# Patient Record
Sex: MALE | Race: WHITE | NOT HISPANIC OR LATINO | Employment: FULL TIME | URBAN - METROPOLITAN AREA
[De-identification: names, ages, dates, MRNs, and addresses within clinical notes are randomized per-mention and may not be internally consistent; named-entity substitution may affect disease eponyms.]

---

## 2023-03-06 PROBLEM — E66.811 CLASS 1 OBESITY WITH BODY MASS INDEX (BMI) OF 33.0 TO 33.9 IN ADULT: Status: ACTIVE | Noted: 2023-03-06

## 2023-03-06 PROBLEM — R53.83 MALAISE AND FATIGUE: Status: ACTIVE | Noted: 2023-03-06

## 2023-03-06 PROBLEM — F17.200 NICOTINE DEPENDENCE: Status: ACTIVE | Noted: 2023-03-06

## 2023-03-06 PROBLEM — E66.9 CLASS 1 OBESITY WITH BODY MASS INDEX (BMI) OF 33.0 TO 33.9 IN ADULT: Status: ACTIVE | Noted: 2023-03-06

## 2023-03-06 PROBLEM — H10.9 CONJUNCTIVITIS: Status: ACTIVE | Noted: 2023-03-06

## 2023-03-06 PROBLEM — J32.9 SINUSITIS: Status: ACTIVE | Noted: 2023-03-06

## 2023-03-06 PROBLEM — R53.81 MALAISE AND FATIGUE: Status: ACTIVE | Noted: 2023-03-06

## 2023-03-06 PROBLEM — M25.569 JOINT PAIN, KNEE: Status: ACTIVE | Noted: 2023-03-06

## 2023-03-06 PROBLEM — E55.9 VITAMIN D DEFICIENCY: Status: ACTIVE | Noted: 2023-03-06

## 2023-03-06 PROBLEM — S49.92XA INJURY OF LEFT SHOULDER: Status: ACTIVE | Noted: 2023-03-06

## 2023-03-06 PROBLEM — M54.9 BACK PAIN: Status: ACTIVE | Noted: 2023-03-06

## 2023-03-06 PROBLEM — L02.01 ABSCESS OF FACE: Status: ACTIVE | Noted: 2023-03-06

## 2023-03-06 PROBLEM — M54.2 NECK PAIN: Status: ACTIVE | Noted: 2023-03-06

## 2023-03-06 PROBLEM — M79.604 RIGHT LEG PAIN: Status: ACTIVE | Noted: 2023-03-06

## 2023-03-06 PROBLEM — E78.5 HYPERLIPIDEMIA: Status: ACTIVE | Noted: 2023-03-06

## 2023-03-06 PROBLEM — H57.89 IRRITATION OF RIGHT EYE: Status: ACTIVE | Noted: 2023-03-06

## 2023-03-06 PROBLEM — G47.00 INSOMNIA: Status: ACTIVE | Noted: 2023-03-06

## 2023-03-06 RX ORDER — VALACYCLOVIR HYDROCHLORIDE 500 MG/1
1 TABLET, FILM COATED ORAL 3 TIMES DAILY
COMMUNITY
Start: 2019-01-30

## 2023-03-06 RX ORDER — TOBRAMYCIN 3 MG/ML
SOLUTION/ DROPS OPHTHALMIC
COMMUNITY
Start: 2022-05-25

## 2023-03-06 RX ORDER — CIPROFLOXACIN 500 MG/1
1 TABLET ORAL EVERY 12 HOURS
COMMUNITY
Start: 2019-01-30

## 2023-03-06 RX ORDER — CHOLECALCIFEROL (VITAMIN D3) 125 MCG
1 CAPSULE ORAL DAILY
COMMUNITY
Start: 2019-01-18

## 2023-03-08 ENCOUNTER — OFFICE VISIT (OUTPATIENT)
Dept: PRIMARY CARE | Facility: CLINIC | Age: 38
End: 2023-03-08
Payer: COMMERCIAL

## 2023-03-08 VITALS
WEIGHT: 262 LBS | SYSTOLIC BLOOD PRESSURE: 120 MMHG | HEIGHT: 77 IN | DIASTOLIC BLOOD PRESSURE: 82 MMHG | BODY MASS INDEX: 30.94 KG/M2

## 2023-03-08 DIAGNOSIS — K80.63 GALLBLADDER & BILE DUCT STONE, ACUTE CHOLECYSTITIS AND OBSTRUCTION: Primary | ICD-10-CM

## 2023-03-08 PROCEDURE — 4004F PT TOBACCO SCREEN RCVD TLK: CPT | Performed by: INTERNAL MEDICINE

## 2023-03-08 PROCEDURE — 99213 OFFICE O/P EST LOW 20 MIN: CPT | Performed by: INTERNAL MEDICINE

## 2023-03-08 RX ORDER — OMEPRAZOLE 20 MG/1
40 TABLET, DELAYED RELEASE ORAL
Qty: 60 TABLET | Refills: 11 | Status: SHIPPED | OUTPATIENT
Start: 2023-03-08 | End: 2024-03-07

## 2023-03-08 RX ORDER — CIPROFLOXACIN 500 MG/1
500 TABLET ORAL 2 TIMES DAILY
Qty: 20 TABLET | Refills: 0 | Status: SHIPPED | OUTPATIENT
Start: 2023-03-08 | End: 2023-03-18

## 2023-03-08 RX ORDER — METRONIDAZOLE 250 MG/1
250 TABLET ORAL 3 TIMES DAILY
Qty: 21 TABLET | Refills: 0 | Status: SHIPPED | OUTPATIENT
Start: 2023-03-08 | End: 2023-03-15

## 2023-03-08 NOTE — PROGRESS NOTES
"OFFICE NOTE    NAME OF THE PATIENT: Umang Streeter    DATE OF BIRTH:     CHIEF COMPLAINT:  This gentleman today came here for right upper quadrant pain, not feeling good, nausea, dyspepsia.  He diagnosed himself gallbladder problem.  Probably, he has gallbladder.  He has this problem before.    PAST MEDICAL HISTORY:  Reviewed on EMR, unchanged.    CURRENT MEDICATIONS:  Reviewed on EMR, unchanged.  None.    ALLERGIES:  Reviewed on EMR, unchanged.  None.    SOCIAL HISTORY:  Reviewed on EMR, unchanged.  He does not smoke, does not drink alcohol.    FAMILY HISTORY:  Reviewed on EMR, unchanged.    REVIEW OF SYSTEMS:  All 12 systems reviewed and pertaining covered in history and physical.    PHYSICAL EXAMINATION  VITAL SIGNS:  As recorded and reviewed from EMR.  RESPIRATORY:  The patient had normal inspirations and expirations.  The breath sounds were equal bilaterally and clear to auscultation.  CARDIOVASCULAR:  The patient had S1 normal, split S2 without obvious rubs, clicks, or murmurs.    GASTROINTESTINAL:  Right upper quadrant tender.  Rebound and guarding.  EXTREMITIES:  Legs had no edema.  NEUROLOGIC:  The patient had normal cranial nerves.  The reflexes, sensory, and motor examination were grossly within normal limits.    LAB WORK:  Laboratory testing discussed.    ASSESSMENT AND PLAN:  Right upper quadrant pain, gallbladder issue.  Immediate ultrasound.         Cipro and Flagyl.  GERD, on PPI.  Liquid diet, blood work.  I will communicate.  If situation gets worse he will call me.  I will admit him.    Kindly review this note in conjunction with EMR.     Subjective   Patient ID: Umang Streeter is a 38 y.o. male who presents for Abdominal Pain.      HPI    Review of Systems    Objective   /82   Ht 1.943 m (6' 4.5\")   Wt 119 kg (262 lb)   BMI 31.48 kg/m²       Physical Exam    Assessment/Plan   Problem List Items Addressed This Visit    None        "

## 2023-03-09 ENCOUNTER — LAB (OUTPATIENT)
Dept: LAB | Facility: LAB | Age: 38
End: 2023-03-09
Payer: COMMERCIAL

## 2023-03-09 DIAGNOSIS — K80.63 GALLBLADDER & BILE DUCT STONE, ACUTE CHOLECYSTITIS AND OBSTRUCTION: ICD-10-CM

## 2023-03-09 LAB
BASOPHILS (10*3/UL) IN BLOOD BY AUTOMATED COUNT: 0.06 X10E9/L (ref 0–0.1)
BASOPHILS/100 LEUKOCYTES IN BLOOD BY AUTOMATED COUNT: 0.8 % (ref 0–2)
EOSINOPHILS (10*3/UL) IN BLOOD BY AUTOMATED COUNT: 0.14 X10E9/L (ref 0–0.7)
EOSINOPHILS/100 LEUKOCYTES IN BLOOD BY AUTOMATED COUNT: 1.8 % (ref 0–6)
ERYTHROCYTE DISTRIBUTION WIDTH (RATIO) BY AUTOMATED COUNT: 13.5 % (ref 11.5–14.5)
ERYTHROCYTE MEAN CORPUSCULAR HEMOGLOBIN CONCENTRATION (G/DL) BY AUTOMATED: 30.6 G/DL (ref 32–36)
ERYTHROCYTE MEAN CORPUSCULAR VOLUME (FL) BY AUTOMATED COUNT: 95 FL (ref 80–100)
ERYTHROCYTES (10*6/UL) IN BLOOD BY AUTOMATED COUNT: 4.67 X10E12/L (ref 4.5–5.9)
HEMATOCRIT (%) IN BLOOD BY AUTOMATED COUNT: 44.4 % (ref 41–52)
HEMOGLOBIN (G/DL) IN BLOOD: 13.6 G/DL (ref 13.5–17.5)
IMMATURE GRANULOCYTES/100 LEUKOCYTES IN BLOOD BY AUTOMATED COUNT: 0.1 % (ref 0–0.9)
LEUKOCYTES (10*3/UL) IN BLOOD BY AUTOMATED COUNT: 8 X10E9/L (ref 4.4–11.3)
LYMPHOCYTES (10*3/UL) IN BLOOD BY AUTOMATED COUNT: 3.19 X10E9/L (ref 1.2–4.8)
LYMPHOCYTES/100 LEUKOCYTES IN BLOOD BY AUTOMATED COUNT: 40.1 % (ref 13–44)
MONOCYTES (10*3/UL) IN BLOOD BY AUTOMATED COUNT: 0.53 X10E9/L (ref 0.1–1)
MONOCYTES/100 LEUKOCYTES IN BLOOD BY AUTOMATED COUNT: 6.7 % (ref 2–10)
NEUTROPHILS (10*3/UL) IN BLOOD BY AUTOMATED COUNT: 4.02 X10E9/L (ref 1.2–7.7)
NEUTROPHILS/100 LEUKOCYTES IN BLOOD BY AUTOMATED COUNT: 50.5 % (ref 40–80)
NRBC (PER 100 WBCS) BY AUTOMATED COUNT: 0 /100 WBC (ref 0–0)
PLATELETS (10*3/UL) IN BLOOD AUTOMATED COUNT: 269 X10E9/L (ref 150–450)

## 2023-03-09 PROCEDURE — 83690 ASSAY OF LIPASE: CPT

## 2023-03-09 PROCEDURE — 82150 ASSAY OF AMYLASE: CPT

## 2023-03-09 PROCEDURE — 36415 COLL VENOUS BLD VENIPUNCTURE: CPT

## 2023-03-09 PROCEDURE — 85025 COMPLETE CBC W/AUTO DIFF WBC: CPT

## 2023-03-09 PROCEDURE — 80053 COMPREHEN METABOLIC PANEL: CPT

## 2023-03-10 LAB
ALANINE AMINOTRANSFERASE (SGPT) (U/L) IN SER/PLAS: 19 U/L (ref 10–52)
ALBUMIN (G/DL) IN SER/PLAS: 4.3 G/DL (ref 3.4–5)
ALKALINE PHOSPHATASE (U/L) IN SER/PLAS: 84 U/L (ref 33–120)
AMYLASE (U/L) IN SER/PLAS: 24 U/L (ref 29–103)
ANION GAP IN SER/PLAS: 12 MMOL/L (ref 10–20)
ASPARTATE AMINOTRANSFERASE (SGOT) (U/L) IN SER/PLAS: 18 U/L (ref 9–39)
BILIRUBIN TOTAL (MG/DL) IN SER/PLAS: 0.4 MG/DL (ref 0–1.2)
CALCIUM (MG/DL) IN SER/PLAS: 9.3 MG/DL (ref 8.6–10.6)
CARBON DIOXIDE, TOTAL (MMOL/L) IN SER/PLAS: 28 MMOL/L (ref 21–32)
CHLORIDE (MMOL/L) IN SER/PLAS: 104 MMOL/L (ref 98–107)
CREATININE (MG/DL) IN SER/PLAS: 1.14 MG/DL (ref 0.5–1.3)
GFR MALE: 83 ML/MIN/1.73M2
GLUCOSE (MG/DL) IN SER/PLAS: 111 MG/DL (ref 74–99)
LIPASE (U/L) IN SER/PLAS: 21 U/L (ref 9–82)
POTASSIUM (MMOL/L) IN SER/PLAS: 4.4 MMOL/L (ref 3.5–5.3)
PROTEIN TOTAL: 6.8 G/DL (ref 6.4–8.2)
SODIUM (MMOL/L) IN SER/PLAS: 140 MMOL/L (ref 136–145)
UREA NITROGEN (MG/DL) IN SER/PLAS: 18 MG/DL (ref 6–23)

## 2023-11-11 ENCOUNTER — HOSPITAL ENCOUNTER (EMERGENCY)
Facility: HOSPITAL | Age: 38
Discharge: HOME | End: 2023-11-11
Attending: STUDENT IN AN ORGANIZED HEALTH CARE EDUCATION/TRAINING PROGRAM
Payer: COMMERCIAL

## 2023-11-11 ENCOUNTER — APPOINTMENT (OUTPATIENT)
Dept: RADIOLOGY | Facility: HOSPITAL | Age: 38
End: 2023-11-11
Payer: COMMERCIAL

## 2023-11-11 VITALS
DIASTOLIC BLOOD PRESSURE: 91 MMHG | HEART RATE: 65 BPM | OXYGEN SATURATION: 98 % | TEMPERATURE: 98.1 F | SYSTOLIC BLOOD PRESSURE: 131 MMHG | RESPIRATION RATE: 16 BRPM | BODY MASS INDEX: 33.12 KG/M2 | WEIGHT: 272 LBS | HEIGHT: 76 IN

## 2023-11-11 DIAGNOSIS — R07.9 CHEST PAIN, UNSPECIFIED TYPE: ICD-10-CM

## 2023-11-11 DIAGNOSIS — J06.9 URI, ACUTE: Primary | ICD-10-CM

## 2023-11-11 LAB
ALBUMIN SERPL-MCNC: 4.2 G/DL (ref 3.5–5)
ALP BLD-CCNC: 99 U/L (ref 35–125)
ALT SERPL-CCNC: 22 U/L (ref 5–40)
ANION GAP SERPL CALC-SCNC: 11 MMOL/L
AST SERPL-CCNC: 25 U/L (ref 5–40)
BASOPHILS # BLD AUTO: 0.05 X10*3/UL (ref 0–0.1)
BASOPHILS NFR BLD AUTO: 0.6 %
BILIRUB SERPL-MCNC: 0.3 MG/DL (ref 0.1–1.2)
BUN SERPL-MCNC: 14 MG/DL (ref 8–25)
CALCIUM SERPL-MCNC: 8.9 MG/DL (ref 8.5–10.4)
CHLORIDE SERPL-SCNC: 102 MMOL/L (ref 97–107)
CO2 SERPL-SCNC: 24 MMOL/L (ref 24–31)
CREAT SERPL-MCNC: 1 MG/DL (ref 0.4–1.6)
EOSINOPHIL # BLD AUTO: 0.14 X10*3/UL (ref 0–0.7)
EOSINOPHIL NFR BLD AUTO: 1.8 %
ERYTHROCYTE [DISTWIDTH] IN BLOOD BY AUTOMATED COUNT: 14.1 % (ref 11.5–14.5)
FLUAV RNA RESP QL NAA+PROBE: NOT DETECTED
FLUBV RNA RESP QL NAA+PROBE: NOT DETECTED
GFR SERPL CREATININE-BSD FRML MDRD: >90 ML/MIN/1.73M*2
GLUCOSE SERPL-MCNC: 94 MG/DL (ref 65–99)
HCT VFR BLD AUTO: 44.3 % (ref 41–52)
HGB BLD-MCNC: 14.4 G/DL (ref 13.5–17.5)
IMM GRANULOCYTES # BLD AUTO: 0.02 X10*3/UL (ref 0–0.7)
IMM GRANULOCYTES NFR BLD AUTO: 0.3 % (ref 0–0.9)
LYMPHOCYTES # BLD AUTO: 2.79 X10*3/UL (ref 1.2–4.8)
LYMPHOCYTES NFR BLD AUTO: 35.8 %
MCH RBC QN AUTO: 29.3 PG (ref 26–34)
MCHC RBC AUTO-ENTMCNC: 32.5 G/DL (ref 32–36)
MCV RBC AUTO: 90 FL (ref 80–100)
MONOCYTES # BLD AUTO: 0.51 X10*3/UL (ref 0.1–1)
MONOCYTES NFR BLD AUTO: 6.5 %
NEUTROPHILS # BLD AUTO: 4.29 X10*3/UL (ref 1.2–7.7)
NEUTROPHILS NFR BLD AUTO: 55 %
NRBC BLD-RTO: 0 /100 WBCS (ref 0–0)
PLATELET # BLD AUTO: 269 X10*3/UL (ref 150–450)
POTASSIUM SERPL-SCNC: 4.2 MMOL/L (ref 3.4–5.1)
PROT SERPL-MCNC: 7.2 G/DL (ref 5.9–7.9)
RBC # BLD AUTO: 4.91 X10*6/UL (ref 4.5–5.9)
SARS-COV-2 RNA RESP QL NAA+PROBE: NOT DETECTED
SODIUM SERPL-SCNC: 137 MMOL/L (ref 133–145)
TROPONIN T SERPL-MCNC: 6 NG/L
TROPONIN T SERPL-MCNC: <6 NG/L
WBC # BLD AUTO: 7.8 X10*3/UL (ref 4.4–11.3)

## 2023-11-11 PROCEDURE — 99283 EMERGENCY DEPT VISIT LOW MDM: CPT

## 2023-11-11 PROCEDURE — 85025 COMPLETE CBC W/AUTO DIFF WBC: CPT | Performed by: STUDENT IN AN ORGANIZED HEALTH CARE EDUCATION/TRAINING PROGRAM

## 2023-11-11 PROCEDURE — 84484 ASSAY OF TROPONIN QUANT: CPT | Performed by: STUDENT IN AN ORGANIZED HEALTH CARE EDUCATION/TRAINING PROGRAM

## 2023-11-11 PROCEDURE — 99285 EMERGENCY DEPT VISIT HI MDM: CPT | Mod: 25 | Performed by: STUDENT IN AN ORGANIZED HEALTH CARE EDUCATION/TRAINING PROGRAM

## 2023-11-11 PROCEDURE — 94760 N-INVAS EAR/PLS OXIMETRY 1: CPT

## 2023-11-11 PROCEDURE — 80053 COMPREHEN METABOLIC PANEL: CPT | Performed by: STUDENT IN AN ORGANIZED HEALTH CARE EDUCATION/TRAINING PROGRAM

## 2023-11-11 PROCEDURE — 71045 X-RAY EXAM CHEST 1 VIEW: CPT

## 2023-11-11 PROCEDURE — 36415 COLL VENOUS BLD VENIPUNCTURE: CPT | Performed by: STUDENT IN AN ORGANIZED HEALTH CARE EDUCATION/TRAINING PROGRAM

## 2023-11-11 PROCEDURE — 87636 SARSCOV2 & INF A&B AMP PRB: CPT | Performed by: STUDENT IN AN ORGANIZED HEALTH CARE EDUCATION/TRAINING PROGRAM

## 2023-11-11 ASSESSMENT — HEART SCORE
ECG: NORMAL
HEART SCORE: 1
AGE: <45
TROPONIN: LESS THAN OR EQUAL TO NORMAL LIMIT
HISTORY: SLIGHTLY SUSPICIOUS
RISK FACTORS: 1-2 RISK FACTORS

## 2023-11-11 ASSESSMENT — COLUMBIA-SUICIDE SEVERITY RATING SCALE - C-SSRS
1. IN THE PAST MONTH, HAVE YOU WISHED YOU WERE DEAD OR WISHED YOU COULD GO TO SLEEP AND NOT WAKE UP?: NO
2. HAVE YOU ACTUALLY HAD ANY THOUGHTS OF KILLING YOURSELF?: NO
6. HAVE YOU EVER DONE ANYTHING, STARTED TO DO ANYTHING, OR PREPARED TO DO ANYTHING TO END YOUR LIFE?: NO

## 2023-11-11 ASSESSMENT — PAIN - FUNCTIONAL ASSESSMENT
PAIN_FUNCTIONAL_ASSESSMENT: 0-10
PAIN_FUNCTIONAL_ASSESSMENT: 0-10

## 2023-11-11 ASSESSMENT — PAIN SCALES - GENERAL: PAINLEVEL_OUTOF10: 0 - NO PAIN

## 2023-11-11 NOTE — ED PROVIDER NOTES
HPI   Chief Complaint   Patient presents with    Nausea       This is a 38-year-old male with no significant past medical history presenting the ED for evaluation of cough and congestion, rhinorrhea, chest pain.  He states that he was at work today having a coughing fit and he felt like he was going to pass out.  He felt lightheaded and had some chest pain but did not lose consciousness.  He states that he still is having intermittent coughing which is nonproductive.  He has never had similar chest pain in the past.  He denies any strong family history of CAD prior to age 50 or sudden cardiac death in the family.  He has no known history of heart disease.      History provided by:  Patient   used: No                        No data recorded                Patient History   History reviewed. No pertinent past medical history.  History reviewed. No pertinent surgical history.  No family history on file.  Social History     Tobacco Use    Smoking status: Every Day     Packs/day: .5     Types: Cigarettes     Start date: 2010    Smokeless tobacco: Never   Substance Use Topics    Alcohol use: Never     Comment: social    Drug use: Never     Types: Marijuana     Comment: occassionally       Physical Exam   ED Triage Vitals [11/11/23 1019]   Temp Heart Rate Resp BP   37.2 °C (99 °F) 71 16 129/85      SpO2 Temp Source Heart Rate Source Patient Position   100 % Temporal -- Sitting      BP Location FiO2 (%)     Left arm --       Physical Exam  General: well developed, well nourished adult male who is awake and alert, in no apparent distress  Eyes: sclera clear bilaterally, PERRL, EOMI  HENT: normocephalic, atraumatic. Pharynx without erythema or exudates, uvula midline.  Clear rhinorrhea noted.  CV: regular rate and rhythm, no murmur, no gallops, or rubs. radial and dorsalis pedis pulses +2/4 bilaterally  Resp: clear to ascultation bilaterally, no wheezes, rales, or rhonchi  GI: abdomen soft, nontender  without rigidity or guarding, no peritoneal signs, abdomen is nondistended, no masses palpated  MSK: no swelling of the extremities.  Psych: appropriate mood and affect, cooperative with exam  Skin: warm, dry, without evidence of rash or abrasions  Labs Reviewed   INFLUENZA A AND B PCR - Normal       Result Value    Flu A Result Not Detected      Flu B Result Not Detected      Narrative:     This assay is an in vitro diagnostic multiplex nucleic acid amplification test for the detection and discrimination of Influenza A & B from nasopharyngeal specimens, and has been validated for use at [facility]. Negative results do not preclude Influenza A/B infections, and should not be used as the sole basis for diagnosis, treatment, or other management decisions. If Influenza A/B and RSV PCR results are negative, testing for Parainfluenza virus, Adenovirus and Metapneumovirus is routinely performed for [facility] pediatric oncology and intensive care inpatients, and is available on other patients by placing an add-on request.   SARS-COV-2 PCR, SYMPTOMATIC - Normal    Coronavirus 2019, PCR Not Detected      Narrative:     This assay has received FDA Emergency Use Authorization (EUA) and is only authorized for the duration of time that circumstances exist to justify the authorization of the emergency use of in vitro diagnostic tests for the detection of SARS-CoV-2 virus and/or diagnosis of COVID-19 infection under section 564(b)(1) of the Act, 21 U.S.C. 360bbb-3(b)(1). This assay is an in vitro diagnostic nucleic acid amplification test for the qualitative detection of SARS-CoV-2 from nasopharyngeal specimens and has been validated for use at [facility]. Negative results do not preclude COVID-19 infections and should not be used as the sole basis for diagnosis, treatment, or other management decisions.     COMPREHENSIVE METABOLIC PANEL - Normal    Glucose 94      Sodium 137       Potassium 4.2      Chloride 102      Bicarbonate 24      Urea Nitrogen 14      Creatinine 1.00      eGFR >90      Calcium 8.9      Albumin 4.2      Alkaline Phosphatase 99      Total Protein 7.2      AST 25      Bilirubin, Total 0.3      ALT 22      Anion Gap 11     SERIAL TROPONIN, INITIAL ([facility]) - Normal    Troponin T, High Sensitivity <6     SERIAL TROPONIN,  2 HOUR ([facility]) - Normal    Troponin T, High Sensitivity 6     CBC WITH AUTO DIFFERENTIAL    WBC 7.8      nRBC 0.0      RBC 4.91      Hemoglobin 14.4      Hematocrit 44.3      MCV 90      MCH 29.3      MCHC 32.5      RDW 14.1      Platelets 269      Neutrophils % 55.0      Immature Granulocytes %, Automated 0.3      Lymphocytes % 35.8      Monocytes % 6.5      Eosinophils % 1.8      Basophils % 0.6      Neutrophils Absolute 4.29      Immature Granulocytes Absolute, Automated 0.02      Lymphocytes Absolute 2.79      Monocytes Absolute 0.51      Eosinophils Absolute 0.14      Basophils Absolute 0.05     TROPONIN T SERIES, HIGH SENSITIVITY (0, 2 HR, 6 HR)    Narrative:     The following orders were created for panel order Troponin T Series, High Sensitivity (0, 2HR, 6HR).  Procedure                               Abnormality         Status                     ---------                               -----------         ------                     Serial Troponin, Initial...[[id number]]  Normal              Final result               Serial Troponin, 2 Hour ...[[id number]]  Normal              Final result               Serial Troponin, 6 Hour ...[[id number]]                                                   Please view results for these tests on the individual orders.   SERIAL TROPONIN, 6 HOUR ([facility])     XR chest 1 view   Final Result   No evidence of acute cardiopulmonary process.             MACRO:   None        Signed by: Miryam Calvo 11/11/2023 11:38 AM   Dictation workstation:   [id number]            ED Course & [facility]   ED Course as of 11/11/23 1416   Sat Nov 11, 2023    1032 EKG: Normal sinus rhythm, rate of 76 bpm.  Normal axis.  QTc 445 ms, IN interval 178.  No ST elevation or depression, no acute ischemic pattern.  No STEMI.  Normal EKG. [NT]      ED Course User Index  [NT] Boom Loza DO         Diagnoses as of 11/11/23 1416   URI, acute   Chest pain, unspecified type       Medical Decision Making  PMH: Denies  History obtained: directly from patient   Social factors affecting disposition: none    He is in no acute distress here, EKG shows no acute ischemic pattern, no acute abnormalities.  Cardiac work-up ordered to assess for evidence of CAD or ACS given his chest pain which was unremarkable.  He has 2 negative troponins, heart score is 1 due to his smoking history.  Based on the heart score algorithm his risk of major adverse cardiac event in the next 30 days is less than 1%.  There is no evidence of pneumonia or other abnormality on his work-up today.  Presentation is most consistent with viral URI.  He was advised on conservative management at home and was discharged in stable condition.  He is follow-up with his primary care physician for further outpatient management as needed.    Procedure  Procedures     Boom Loza DO  11/11/23 1416

## 2023-11-11 NOTE — DISCHARGE INSTRUCTIONS
Follow up with your primary care physician in the next 24-48 hours to schedule a follow up appointment. Return to the ED for any new or concerning symptoms including but not limited to chest pain with associated shortness of breath, sweating, chest pain that radiates down the arm or into the back or the neck, chest pain that gets worse with exertion and better with rest, or severe worsening of your symptoms.

## 2023-11-11 NOTE — Clinical Note
Umang Streeter was seen and treated in our emergency department on 11/11/2023.  He may return to work on 11/13/2023.       If you have any questions or concerns, please don't hesitate to call.      Boom Loza, DO

## 2023-12-05 ENCOUNTER — HOSPITAL ENCOUNTER (OUTPATIENT)
Dept: CARDIOLOGY | Facility: HOSPITAL | Age: 38
Discharge: HOME | End: 2023-12-05
Payer: COMMERCIAL

## 2023-12-05 LAB
ATRIAL RATE: 76 BPM
P AXIS: 66 DEGREES
P OFFSET: 192 MS
P ONSET: 134 MS
PR INTERVAL: 178 MS
Q ONSET: 223 MS
QRS COUNT: 13 BEATS
QRS DURATION: 96 MS
QT INTERVAL: 396 MS
QTC CALCULATION(BAZETT): 445 MS
QTC FREDERICIA: 428 MS
R AXIS: 65 DEGREES
T AXIS: 40 DEGREES
T OFFSET: 421 MS
VENTRICULAR RATE: 76 BPM

## 2023-12-05 PROCEDURE — 93005 ELECTROCARDIOGRAM TRACING: CPT

## 2025-03-19 ENCOUNTER — APPOINTMENT (OUTPATIENT)
Dept: CARDIOLOGY | Facility: HOSPITAL | Age: 40
End: 2025-03-19
Payer: COMMERCIAL

## 2025-03-19 ENCOUNTER — APPOINTMENT (OUTPATIENT)
Dept: RADIOLOGY | Facility: HOSPITAL | Age: 40
End: 2025-03-19
Payer: COMMERCIAL

## 2025-03-19 ENCOUNTER — HOSPITAL ENCOUNTER (EMERGENCY)
Facility: HOSPITAL | Age: 40
Discharge: HOME | End: 2025-03-19
Attending: EMERGENCY MEDICINE
Payer: COMMERCIAL

## 2025-03-19 VITALS
SYSTOLIC BLOOD PRESSURE: 138 MMHG | OXYGEN SATURATION: 96 % | WEIGHT: 275 LBS | BODY MASS INDEX: 33.49 KG/M2 | HEART RATE: 69 BPM | DIASTOLIC BLOOD PRESSURE: 94 MMHG | TEMPERATURE: 98.4 F | HEIGHT: 76 IN | RESPIRATION RATE: 16 BRPM

## 2025-03-19 DIAGNOSIS — I10 DIASTOLIC HYPERTENSION: ICD-10-CM

## 2025-03-19 DIAGNOSIS — J06.9 UPPER RESPIRATORY INFECTION WITH COUGH AND CONGESTION: ICD-10-CM

## 2025-03-19 DIAGNOSIS — R53.83 MALAISE AND FATIGUE: ICD-10-CM

## 2025-03-19 DIAGNOSIS — R53.81 MALAISE AND FATIGUE: ICD-10-CM

## 2025-03-19 DIAGNOSIS — N39.0 ACUTE LOWER URINARY TRACT INFECTION: ICD-10-CM

## 2025-03-19 DIAGNOSIS — R55 NEAR SYNCOPE: Primary | ICD-10-CM

## 2025-03-19 LAB
ALBUMIN SERPL BCP-MCNC: 3.8 G/DL (ref 3.4–5)
ALP SERPL-CCNC: 79 U/L (ref 33–120)
ALT SERPL W P-5'-P-CCNC: 25 U/L (ref 10–52)
ANION GAP SERPL CALCULATED.3IONS-SCNC: 11 MMOL/L (ref 10–20)
APPEARANCE UR: CLEAR
AST SERPL W P-5'-P-CCNC: 23 U/L (ref 9–39)
BASOPHILS # BLD AUTO: 0.06 X10*3/UL (ref 0–0.1)
BASOPHILS NFR BLD AUTO: 0.7 %
BILIRUB SERPL-MCNC: 0.4 MG/DL (ref 0–1.2)
BILIRUB UR STRIP.AUTO-MCNC: NEGATIVE MG/DL
BUN SERPL-MCNC: 13 MG/DL (ref 6–23)
CALCIUM SERPL-MCNC: 9 MG/DL (ref 8.6–10.3)
CARDIAC TROPONIN I PNL SERPL HS: 4 NG/L (ref 0–20)
CARDIAC TROPONIN I PNL SERPL HS: 4 NG/L (ref 0–20)
CHLORIDE SERPL-SCNC: 105 MMOL/L (ref 98–107)
CO2 SERPL-SCNC: 25 MMOL/L (ref 21–32)
COLOR UR: ABNORMAL
CREAT SERPL-MCNC: 0.91 MG/DL (ref 0.5–1.3)
EGFRCR SERPLBLD CKD-EPI 2021: >90 ML/MIN/1.73M*2
EOSINOPHIL # BLD AUTO: 0.12 X10*3/UL (ref 0–0.7)
EOSINOPHIL NFR BLD AUTO: 1.4 %
ERYTHROCYTE [DISTWIDTH] IN BLOOD BY AUTOMATED COUNT: 14.2 % (ref 11.5–14.5)
ETHANOL SERPL-MCNC: <10 MG/DL
FLUAV RNA RESP QL NAA+PROBE: NOT DETECTED
FLUBV RNA RESP QL NAA+PROBE: NOT DETECTED
GLUCOSE SERPL-MCNC: 107 MG/DL (ref 74–99)
GLUCOSE UR STRIP.AUTO-MCNC: NORMAL MG/DL
HCT VFR BLD AUTO: 43.9 % (ref 41–52)
HGB BLD-MCNC: 14.3 G/DL (ref 13.5–17.5)
IMM GRANULOCYTES # BLD AUTO: 0.02 X10*3/UL (ref 0–0.7)
IMM GRANULOCYTES NFR BLD AUTO: 0.2 % (ref 0–0.9)
KETONES UR STRIP.AUTO-MCNC: NEGATIVE MG/DL
LEUKOCYTE ESTERASE UR QL STRIP.AUTO: ABNORMAL
LIPASE SERPL-CCNC: 26 U/L (ref 9–82)
LYMPHOCYTES # BLD AUTO: 2.89 X10*3/UL (ref 1.2–4.8)
LYMPHOCYTES NFR BLD AUTO: 32.6 %
MCH RBC QN AUTO: 30.4 PG (ref 26–34)
MCHC RBC AUTO-ENTMCNC: 32.6 G/DL (ref 32–36)
MCV RBC AUTO: 93 FL (ref 80–100)
MONOCYTES # BLD AUTO: 0.6 X10*3/UL (ref 0.1–1)
MONOCYTES NFR BLD AUTO: 6.8 %
MUCOUS THREADS #/AREA URNS AUTO: ABNORMAL /LPF
NEUTROPHILS # BLD AUTO: 5.17 X10*3/UL (ref 1.2–7.7)
NEUTROPHILS NFR BLD AUTO: 58.3 %
NITRITE UR QL STRIP.AUTO: NEGATIVE
NRBC BLD-RTO: 0 /100 WBCS (ref 0–0)
PH UR STRIP.AUTO: 5 [PH]
PLATELET # BLD AUTO: 266 X10*3/UL (ref 150–450)
POTASSIUM SERPL-SCNC: 3.9 MMOL/L (ref 3.5–5.3)
PROT SERPL-MCNC: 6.8 G/DL (ref 6.4–8.2)
PROT UR STRIP.AUTO-MCNC: NEGATIVE MG/DL
RBC # BLD AUTO: 4.7 X10*6/UL (ref 4.5–5.9)
RBC # UR STRIP.AUTO: ABNORMAL MG/DL
RBC #/AREA URNS AUTO: ABNORMAL /HPF
RSV RNA RESP QL NAA+PROBE: NOT DETECTED
SARS-COV-2 RNA RESP QL NAA+PROBE: NOT DETECTED
SODIUM SERPL-SCNC: 137 MMOL/L (ref 136–145)
SP GR UR STRIP.AUTO: 1.03
SQUAMOUS #/AREA URNS AUTO: ABNORMAL /HPF
UROBILINOGEN UR STRIP.AUTO-MCNC: NORMAL MG/DL
WBC # BLD AUTO: 8.9 X10*3/UL (ref 4.4–11.3)
WBC #/AREA URNS AUTO: ABNORMAL /HPF

## 2025-03-19 PROCEDURE — 2500000004 HC RX 250 GENERAL PHARMACY W/ HCPCS (ALT 636 FOR OP/ED): Performed by: PHYSICIAN ASSISTANT

## 2025-03-19 PROCEDURE — 2500000001 HC RX 250 WO HCPCS SELF ADMINISTERED DRUGS (ALT 637 FOR MEDICARE OP): Performed by: PHYSICIAN ASSISTANT

## 2025-03-19 PROCEDURE — 70450 CT HEAD/BRAIN W/O DYE: CPT

## 2025-03-19 PROCEDURE — 87637 SARSCOV2&INF A&B&RSV AMP PRB: CPT | Performed by: PHYSICIAN ASSISTANT

## 2025-03-19 PROCEDURE — 84484 ASSAY OF TROPONIN QUANT: CPT | Performed by: PHYSICIAN ASSISTANT

## 2025-03-19 PROCEDURE — 2500000002 HC RX 250 W HCPCS SELF ADMINISTERED DRUGS (ALT 637 FOR MEDICARE OP, ALT 636 FOR OP/ED): Performed by: PHYSICIAN ASSISTANT

## 2025-03-19 PROCEDURE — 81001 URINALYSIS AUTO W/SCOPE: CPT | Performed by: PHYSICIAN ASSISTANT

## 2025-03-19 PROCEDURE — 36415 COLL VENOUS BLD VENIPUNCTURE: CPT | Performed by: PHYSICIAN ASSISTANT

## 2025-03-19 PROCEDURE — 85025 COMPLETE CBC W/AUTO DIFF WBC: CPT | Performed by: PHYSICIAN ASSISTANT

## 2025-03-19 PROCEDURE — 87086 URINE CULTURE/COLONY COUNT: CPT | Mod: WESLAB | Performed by: PHYSICIAN ASSISTANT

## 2025-03-19 PROCEDURE — 71046 X-RAY EXAM CHEST 2 VIEWS: CPT | Performed by: RADIOLOGY

## 2025-03-19 PROCEDURE — 82077 ASSAY SPEC XCP UR&BREATH IA: CPT | Performed by: PHYSICIAN ASSISTANT

## 2025-03-19 PROCEDURE — 99285 EMERGENCY DEPT VISIT HI MDM: CPT | Mod: 25 | Performed by: EMERGENCY MEDICINE

## 2025-03-19 PROCEDURE — 71046 X-RAY EXAM CHEST 2 VIEWS: CPT

## 2025-03-19 PROCEDURE — 83690 ASSAY OF LIPASE: CPT | Performed by: PHYSICIAN ASSISTANT

## 2025-03-19 PROCEDURE — 84075 ASSAY ALKALINE PHOSPHATASE: CPT | Performed by: PHYSICIAN ASSISTANT

## 2025-03-19 PROCEDURE — 93005 ELECTROCARDIOGRAM TRACING: CPT

## 2025-03-19 RX ORDER — MECLIZINE HYDROCHLORIDE 25 MG/1
50 TABLET ORAL ONCE
Status: COMPLETED | OUTPATIENT
Start: 2025-03-19 | End: 2025-03-19

## 2025-03-19 RX ORDER — CEPHALEXIN 500 MG/1
500 CAPSULE ORAL 2 TIMES DAILY
Qty: 14 CAPSULE | Refills: 0 | Status: SHIPPED | OUTPATIENT
Start: 2025-03-19 | End: 2025-03-26

## 2025-03-19 RX ADMIN — Medication 5 DROP: at 15:06

## 2025-03-19 RX ADMIN — MECLIZINE HYDROCHLORIDE 50 MG: 25 TABLET ORAL at 15:06

## 2025-03-19 RX ADMIN — SODIUM CHLORIDE 1000 ML: 9 INJECTION, SOLUTION INTRAVENOUS at 13:47

## 2025-03-19 ASSESSMENT — LIFESTYLE VARIABLES
TOTAL SCORE: 0
EVER HAD A DRINK FIRST THING IN THE MORNING TO STEADY YOUR NERVES TO GET RID OF A HANGOVER: NO
HAVE YOU EVER FELT YOU SHOULD CUT DOWN ON YOUR DRINKING: NO
HAVE PEOPLE ANNOYED YOU BY CRITICIZING YOUR DRINKING: NO
EVER FELT BAD OR GUILTY ABOUT YOUR DRINKING: NO

## 2025-03-19 ASSESSMENT — PAIN DESCRIPTION - PAIN TYPE: TYPE: ACUTE PAIN

## 2025-03-19 ASSESSMENT — COLUMBIA-SUICIDE SEVERITY RATING SCALE - C-SSRS
2. HAVE YOU ACTUALLY HAD ANY THOUGHTS OF KILLING YOURSELF?: NO
6. HAVE YOU EVER DONE ANYTHING, STARTED TO DO ANYTHING, OR PREPARED TO DO ANYTHING TO END YOUR LIFE?: NO
1. IN THE PAST MONTH, HAVE YOU WISHED YOU WERE DEAD OR WISHED YOU COULD GO TO SLEEP AND NOT WAKE UP?: NO

## 2025-03-19 ASSESSMENT — PAIN - FUNCTIONAL ASSESSMENT: PAIN_FUNCTIONAL_ASSESSMENT: 0-10

## 2025-03-19 ASSESSMENT — PAIN SCALES - GENERAL: PAINLEVEL_OUTOF10: 0 - NO PAIN

## 2025-03-19 NOTE — Clinical Note
Umang Streeter was seen and treated in our emergency department on 3/19/2025.  He may return to work on 03/21/2025.  Do not drive or operate machinery until you are cleared to do so by your primary care provider     If you have any questions or concerns, please don't hesitate to call.      Екатерина Thornton MD

## 2025-03-19 NOTE — Clinical Note
Umang Streeter was seen and treated in our emergency department on 3/19/2025.  He may return to work on 03/22/2025.       If you have any questions or concerns, please don't hesitate to call.      Wilmar Nina PA-C

## 2025-03-19 NOTE — PROGRESS NOTES
Attestation/Supervisory note for GERBER Nina      The patient is a 40-year-old male presenting to the emergency department for feeling generalized malaise, fatigue and near syncope for the past 2 to 3 days.  No specific better or worse.  He denies any headache or visual changes.  No chest pain or shortness of breath.  No palpitations.  No diaphoresis.  No abdominal pain.  No nausea, vomiting or diarrhea.  He states that he has had some cough and congestion.  No known sick contacts or recent travel.  No focal weakness or numbness.  All pertinent positives and negatives are recorded above.  All other systems reviewed and otherwise negative.  Vital signs with diastolic hypertension but otherwise within normal limits.  Physical exam with a well-nourished well-developed male in no acute distress.  HEENT exam within normal limits.  He has no evidence of airway compromise or respiratory distress.  Abdominal exam is benign.  He does not have any gross motor, neurologic or vascular deficits on exam.  NIH stroke scale score of 0.  He is able to walk and stand without difficulty.  He is able to converse without difficulty.  Pulses were equal bilaterally.      EKG with normal sinus rhythm at 75 bpm, normal axis, normal voltage, normal ST segment, normal T waves      IV fluids ordered with some improvement in his symptoms      Diagnostic labs with evidence of a possible urinary tract infection but otherwise unremarkable.      Initial troponin 4.  Repeat troponin 4      CT head wo IV contrast   Final Result   Mild paranasal sinusitis without fluid level.        No acute intracranial bleed or focal mass effect.        MACRO:   None        Signed by: Tremaine Asif 3/19/2025 4:08 PM   Dictation workstation:   [id number]      XR chest 2 views   Final Result   Normal chest radiographs.        Signed by: Eliseo Ferguson 3/19/2025 2:27 PM   Dictation workstation:   [id number]              The patient does not have any evidence of airway  compromise or respiratory distress on exam.  He is well-perfused on exam.  No evidence of sepsis.  No evidence of a STEMI on EKG or cardiac enzymes.  No events on telemetry.  Chest x-ray without acute process.  He has no evidence of pneumonia, pneumothorax or CHF.  No widening of the mediastinum.  His pulses are equal bilaterally.  Diagnostic labs with evidence of a possible urinary tract infection but otherwise unremarkable.  CT head without evidence of intracranial hemorrhage, mass effect or CVA.      The patient was ambulatory in the emergency room without any difficulty.      The patient was released in good condition with a prescription for Keflex and instructions to follow-up with his primary care physician within 1 to 2 days for further management of his current symptoms, review of the urine culture results and repeat check of his blood pressure.  He will return to the emergency department sooner with worsening of symptoms or onset of new symptoms      Impression/diagnosis:  Malaise and fatigue  Cough and congestion  Diastolic hypertension  Near syncope  Acute lower urinary tract infection      I personally saw the patient and made/approve the management plan and take responsibility for the patient management.      I independently interpreted the following study (S) EKG and diagnostic labs      I personally discussed the patient's management with the patient      I reviewed the results of the diagnostic labs and diagnostic imaging.  Formal radiology read was completed by the radiologist.      Екатерина Thornton MD

## 2025-03-19 NOTE — ED PROVIDER NOTES
HPI   Chief Complaint   Patient presents with    Dizziness     Pt states that he has been dizzy for a few days and today on the toilet he felt dizzy and like he was going to pass out, pt ambulatory in triage       40-year-old male presented emergency department with a chief complaint of several days of lightheadedness dizziness.  Feels like he is going to pass out.  Seems worse with position change and movement.  Denies chest pain, shortness of breath, focal numbness weakness.  Denies injury or trauma.  No other complaint.              Patient History   No past medical history on file.  No past surgical history on file.  No family history on file.  Social History     Tobacco Use    Smoking status: Every Day     Current packs/day: 0.50     Average packs/day: 0.5 packs/day for 15.2 years (7.6 ttl pk-yrs)     Types: Cigarettes     Start date: 2010    Smokeless tobacco: Never   Substance Use Topics    Alcohol use: Never     Comment: social    Drug use: Never     Types: Marijuana     Comment: occassionally       Physical Exam   ED Triage Vitals [03/19/25 1329]   Temperature Heart Rate Respirations BP   36.9 °C (98.4 °F) 69 16 (!) 138/94      Pulse Ox Temp Source Heart Rate Source Patient Position   96 % Temporal Monitor --      BP Location FiO2 (%)     -- --       Physical Exam  Vitals and nursing note reviewed.   Constitutional:       Appearance: Normal appearance.   HENT:      Head: Normocephalic.      Nose: Nose normal.      Mouth/Throat:      Mouth: Mucous membranes are moist.   Cardiovascular:      Rate and Rhythm: Normal rate and regular rhythm.      Pulses: Normal pulses.      Heart sounds: Normal heart sounds.   Pulmonary:      Effort: Pulmonary effort is normal.      Breath sounds: Normal breath sounds.   Abdominal:      General: Abdomen is flat.      Palpations: Abdomen is soft.   Musculoskeletal:         General: Normal range of motion.      Cervical back: Normal range of motion.   Skin:     General: Skin is  warm.   Neurological:      General: No focal deficit present.      Mental Status: He is alert and oriented to person, place, and time.   Psychiatric:         Mood and Affect: Mood normal.         Behavior: Behavior normal.           ED Course & MDM   ED Course as of 03/19/25 1701   Wed Mar 19, 2025   1336 I personally reviewed and interpreted the EKG @ 1337: NSR 75, normal axis/intervals and no appreciable ischemia, non-specific TW findings, and prior EKG on 11/11/2023 reviewed without any appreciable specific/identifiable changes [BC]      ED Course User Index  [BC] Norman Chan MD         Diagnoses as of 03/19/25 1701   Near syncope   Malaise and fatigue   Acute lower urinary tract infection   Upper respiratory infection with cough and congestion   Diastolic hypertension                 No data recorded     [place] Coma Scale Score: 15 (03/19/25 1330 : Aubree Longoria RN)                           Medical Decision Making  I have seen and evaluated this patient.  The attending physician has also seen and evaluated this patient.  Vital signs, laboratory testing and diagnostic images if applicable have been reviewed.  All laboratory and imaging is interpreted by myself unless otherwise stated.  Radiology studies are also formally interpreted by radiologist.     CBC without significant leukocytosis or anemia, metabolic panel without significant renal impairment or electrolyte abnormality.  Troponin within an appropriate range without significant delta change, chest x-ray without actionable cardiopulmonary process, EKG without evidence of acute ischemia.  CT brain negative.  Patient improved evidence of UTI discharged on antibiotic primary follow-up.  Leaves in stable condition from emergent standpoint.      Labs Reviewed   COMPREHENSIVE METABOLIC PANEL - Abnormal       Result Value    Glucose 107 (*)     Sodium 137      Potassium 3.9      Chloride 105      Bicarbonate 25      Anion Gap 11      Urea  Nitrogen 13      Creatinine 0.91      eGFR >90      Calcium 9.0      Albumin 3.8      Alkaline Phosphatase 79      Total Protein 6.8      AST 23      Bilirubin, Total 0.4      ALT 25     URINALYSIS WITH REFLEX CULTURE AND MICROSCOPIC - Abnormal    Color, Urine Light-Yellow      Appearance, Urine Clear      Specific Gravity, Urine 1.028      pH, Urine 5.0      Protein, Urine NEGATIVE      Glucose, Urine Normal      Blood, Urine 0.03 (TRACE) (*)     Ketones, Urine NEGATIVE      Bilirubin, Urine NEGATIVE      Urobilinogen, Urine Normal      Nitrite, Urine NEGATIVE      Leukocyte Esterase, Urine 250 Fernie/uL (*)    MICROSCOPIC ONLY, URINE - Abnormal    WBC, Urine 6-10 (*)     RBC, Urine NONE      Squamous Epithelial Cells, Urine 1-9 (SPARSE)      Mucus, Urine 1+     LIPASE - Normal    Lipase 26      Narrative:     Venipuncture immediately after or during the administration of Metamizole may lead to falsely low results. Testing should be performed immediately prior to Metamizole dosing.   SARS-COV-2, INFLUENZA A/B AND RSV PCR - Normal    Coronavirus 2019, PCR Not Detected      Flu A Result Not Detected      Flu B Result Not Detected      RSV PCR Not Detected      Narrative:     This assay is an FDA-cleared, in vitro diagnostic nucleic acid amplification test for the qualitative detection and differentiation of SARS CoV-2/ Influenza A/B/ RSV from nasopharyngeal specimens collected from individuals with signs and symptoms of respiratory tract infections, and has been validated for use at [facility]. Negative results do not preclude COVID-19/ Influenza A/B/ RSV infections and should not be used as the sole basis for diagnosis, treatment, or other management decisions. Testing for SARS CoV-2 is recommended only for patients who meet current clinical and/or epidemiological criteria defined by federal, state, or local public health directives.   SERIAL TROPONIN-INITIAL - Normal    Troponin I, High  Sensitivity 4      Narrative:     Less than 99th percentile of normal range cutoff-  Female and children under 18 years old <14 ng/L; Male <21 ng/L: Negative  Repeat testing should be performed if clinically indicated.     Female and children under 18 years old 14-50 ng/L; Male 21-50 ng/L:  Consistent with possible cardiac damage and possible increased clinical   risk. Serial measurements may help to assess extent of myocardial damage.     >50 ng/L: Consistent with cardiac damage, increased clinical risk and  myocardial infarction. Serial measurements may help assess extent of   myocardial damage.      NOTE: Children less than 1 year old may have higher baseline troponin   levels and results should be interpreted in conjunction with the overall   clinical context.     NOTE: Troponin I testing is performed using a different   testing methodology at [facility] than at other   [facility]. Direct result comparisons should only   be made within the same method.   SERIAL TROPONIN, 1 HOUR - Normal    Troponin I, High Sensitivity 4      Narrative:     Less than 99th percentile of normal range cutoff-  Female and children under 18 years old <14 ng/L; Male <21 ng/L: Negative  Repeat testing should be performed if clinically indicated.     Female and children under 18 years old 14-50 ng/L; Male 21-50 ng/L:  Consistent with possible cardiac damage and possible increased clinical   risk. Serial measurements may help to assess extent of myocardial damage.     >50 ng/L: Consistent with cardiac damage, increased clinical risk and  myocardial infarction. Serial measurements may help assess extent of   myocardial damage.      NOTE: Children less than 1 year old may have higher baseline troponin   levels and results should be interpreted in conjunction with the overall   clinical context.     NOTE: Troponin I testing is performed using a different   testing methodology at [facility] than at other    [facility]. Direct result comparisons should only   be made within the same method.   ALCOHOL - Normal    Alcohol <10     URINE CULTURE   CBC WITH AUTO DIFFERENTIAL    WBC 8.9      nRBC 0.0      RBC 4.70      Hemoglobin 14.3      Hematocrit 43.9      MCV 93      MCH 30.4      MCHC 32.6      RDW 14.2      Platelets 266      Neutrophils % 58.3      Immature Granulocytes %, Automated 0.2      Lymphocytes % 32.6      Monocytes % 6.8      Eosinophils % 1.4      Basophils % 0.7      Neutrophils Absolute 5.17      Immature Granulocytes Absolute, Automated 0.02      Lymphocytes Absolute 2.89      Monocytes Absolute 0.60      Eosinophils Absolute 0.12      Basophils Absolute 0.06     TROPONIN SERIES- (INITIAL, 1 HR)    Narrative:     The following orders were created for panel order Troponin Series, (0, 1 HR).  Procedure                               Abnormality         Status                     ---------                               -----------         ------                     Troponin I, High Sensiti...[[id number]]  Normal              Final result               Troponin, High Sensitivi...[[id number]]  Normal              Final result                 Please view results for these tests on the individual orders.   URINALYSIS WITH REFLEX CULTURE AND MICROSCOPIC    Narrative:     The following orders were created for panel order Urinalysis with Reflex Culture and Microscopic.  Procedure                               Abnormality         Status                     ---------                               -----------         ------                     Urinalysis with Reflex C...[[id number]]  Abnormal            Final result               Extra Urine Gray Tube[[id number]]                            In process                   Please view results for these tests on the individual orders.   EXTRA URINE GRAY TUBE     CT head wo IV contrast   Final Result   Mild paranasal sinusitis without fluid level.        No acute  intracranial bleed or focal mass effect.        MACRO:   None        Signed by: Tremaine Asif 3/19/2025 4:08 PM   Dictation workstation:   [id number]      XR chest 2 views   Final Result   Normal chest radiographs.        Signed by: Eliseo Ferguson 3/19/2025 2:27 PM   Dictation workstation:   [id number]        Medications   sodium chloride 0.9 % bolus 1,000 mL (0 mL intravenous Stopped 3/19/25 1446)   carbamide peroxide (Debrox) 6.5 % otic solution 5 drop (5 drops Right Ear Given 3/19/25 1506)   meclizine (Antivert) tablet 50 mg (50 mg oral Given 3/19/25 1506)     New Prescriptions    CEPHALEXIN (KEFLEX) 500 MG CAPSULE    Take 1 capsule (500 mg) by mouth 2 times a day for 7 days.         Procedure  Procedures     Wilmar Nina PA-C  03/19/25 8785

## 2025-03-20 ENCOUNTER — PATIENT OUTREACH (OUTPATIENT)
Dept: CARE COORDINATION | Facility: CLINIC | Age: 40
End: 2025-03-20
Payer: COMMERCIAL

## 2025-03-20 LAB
BACTERIA UR CULT: NO GROWTH
HOLD SPECIMEN: NORMAL

## 2025-03-20 NOTE — PROGRESS NOTES
Outreach call to patient after ED visit yesterday.  Patient stated that he is feeling much better and has not felt lightheaded.   He has increased his fluid intake to make sure that his urinary tract is flushed after UTI diagnosis.   Patient provided with my contact information for any further concerns or questions.    Adele Robertson RN, Care Manager  [facility], [facility]  [phone]

## 2025-05-17 ENCOUNTER — APPOINTMENT (OUTPATIENT)
Dept: RADIOLOGY | Facility: HOSPITAL | Age: 40
End: 2025-05-17
Payer: COMMERCIAL

## 2025-05-17 ENCOUNTER — HOSPITAL ENCOUNTER (EMERGENCY)
Facility: HOSPITAL | Age: 40
Discharge: HOME | End: 2025-05-17
Payer: COMMERCIAL

## 2025-05-17 VITALS
BODY MASS INDEX: 34.46 KG/M2 | RESPIRATION RATE: 16 BRPM | DIASTOLIC BLOOD PRESSURE: 96 MMHG | WEIGHT: 283 LBS | HEART RATE: 76 BPM | OXYGEN SATURATION: 98 % | TEMPERATURE: 99 F | HEIGHT: 76 IN | SYSTOLIC BLOOD PRESSURE: 136 MMHG

## 2025-05-17 DIAGNOSIS — S92.515A CLOSED NONDISPLACED FRACTURE OF PROXIMAL PHALANX OF LESSER TOE OF LEFT FOOT, INITIAL ENCOUNTER: Primary | ICD-10-CM

## 2025-05-17 PROCEDURE — 2500000001 HC RX 250 WO HCPCS SELF ADMINISTERED DRUGS (ALT 637 FOR MEDICARE OP)

## 2025-05-17 PROCEDURE — 73630 X-RAY EXAM OF FOOT: CPT | Mod: LEFT SIDE | Performed by: RADIOLOGY

## 2025-05-17 PROCEDURE — 99283 EMERGENCY DEPT VISIT LOW MDM: CPT

## 2025-05-17 PROCEDURE — 73630 X-RAY EXAM OF FOOT: CPT | Mod: LT

## 2025-05-17 RX ORDER — ACETAMINOPHEN 500 MG
1000 TABLET ORAL ONCE
Status: COMPLETED | OUTPATIENT
Start: 2025-05-17 | End: 2025-05-17

## 2025-05-17 RX ORDER — IBUPROFEN 600 MG/1
600 TABLET, FILM COATED ORAL ONCE
Status: COMPLETED | OUTPATIENT
Start: 2025-05-17 | End: 2025-05-17

## 2025-05-17 RX ADMIN — ACETAMINOPHEN 1000 MG: 500 TABLET ORAL at 12:43

## 2025-05-17 RX ADMIN — IBUPROFEN 600 MG: 600 TABLET ORAL at 12:43

## 2025-05-17 ASSESSMENT — LIFESTYLE VARIABLES
EVER HAD A DRINK FIRST THING IN THE MORNING TO STEADY YOUR NERVES TO GET RID OF A HANGOVER: NO
EVER FELT BAD OR GUILTY ABOUT YOUR DRINKING: NO
HAVE YOU EVER FELT YOU SHOULD CUT DOWN ON YOUR DRINKING: NO
HAVE PEOPLE ANNOYED YOU BY CRITICIZING YOUR DRINKING: NO
TOTAL SCORE: 0

## 2025-05-17 ASSESSMENT — PAIN - FUNCTIONAL ASSESSMENT: PAIN_FUNCTIONAL_ASSESSMENT: 0-10

## 2025-05-17 ASSESSMENT — PAIN DESCRIPTION - LOCATION: LOCATION: TOE (COMMENT WHICH ONE)

## 2025-05-17 ASSESSMENT — PAIN SCALES - GENERAL: PAINLEVEL_OUTOF10: 8

## 2025-05-17 ASSESSMENT — PAIN DESCRIPTION - ORIENTATION: ORIENTATION: LEFT

## 2025-05-17 NOTE — DISCHARGE INSTRUCTIONS
Please follow-up closely with primary care provider in the following week.  Continue Tylenol and ibuprofen as needed for aches and pains.  Continue ice elevation and continue buddy taping for support and continue wearing postop shoe for support as well.  Follow-up closely with podiatry should your symptoms continue.

## 2025-05-17 NOTE — ED PROVIDER NOTES
HPI   Chief Complaint   Patient presents with    Toe Injury     Left middle toe pain- hit yesterday.       Patient is a 40-year-old male who presents emergency department for evaluation of left toe injury.  Patient states that last night he went to throw his garbage away and was wearing slides.  He states that he was running in the rain when he slid and his left foot slid into a wooden post.  He states that he injured the second toe on his left foot has had pain and swelling ever since.  He denies any pain in the foot or ankle and denies any other injuries at this time.  He is relatively healthy individual otherwise.      History provided by:  Patient   used: No            Patient History   Medical History[1]  Surgical History[2]  Family History[3]  Social History[4]    Physical Exam   ED Triage Vitals [05/17/25 1226]   Temperature Heart Rate Respirations BP   37.2 °C (99 °F) 76 16 (!) 136/96      Pulse Ox Temp src Heart Rate Source Patient Position   98 % -- -- Sitting      BP Location FiO2 (%)     Left arm --       Physical Exam  Constitutional:       Appearance: Normal appearance.   Cardiovascular:      Rate and Rhythm: Normal rate and regular rhythm.   Pulmonary:      Effort: Pulmonary effort is normal.      Breath sounds: Normal breath sounds.   Musculoskeletal:         General: Swelling, tenderness and signs of injury present. Normal range of motion.      Comments: Ecchymosis and swelling to second digit of left foot with tenderness palpation over base of second digit.  No tenderness palpation over 1st, 3rd, 4th or 5th digit to left foot with range of motion otherwise intact with brisk capillary refill in all digits.  Strong dorsalis pedis pulses 2   Skin:     General: Skin is warm and dry.   Neurological:      General: No focal deficit present.      Mental Status: He is alert and oriented to person, place, and time.           ED Course & MDM   Diagnoses as of 05/18/25 1026   Closed  nondisplaced fracture of proximal phalanx of lesser toe of left foot, initial encounter                 No data recorded     [place] Coma Scale Score: 15 (05/17/25 1330 : Sandie Law RN)                           Medical Decision Making  Patient is a 40-year-old male presents emergency department for evaluation of left second toe pain.    Labwork not warranted at today's visit.    Scans done today were interpreted/confirmed by radiologist and also interpreted by me which included X-ray left foot.  X-ray shows soft tissue swelling in the second digit with possible nondisplaced fracture of the distal aspect of proximal phalanx.    Medications given at today's visit include p.o. Tylenol and ibuprofen    I saw this patient independently.  Patient found to have likely nondisplaced fracture to distal aspect of proximal phalanx of second digit.  Given this patient her 2nd and 3rd toe were xiao taped and patient given postop shoe for support.  Xiao tape and postop shoe applied by nursing staff under my direct supervision and patient neurovascularly intact following application.  He was educated on continued symptom management including ice elevation and close follow-up with podiatry to ensure healing.  He is agreeable to plan and discharge at this time.  Emergent pathologies were considered for this patient, although I have low suspicion for anything acutely emergent given patient's clinical presentation, history, physical exam, stable vital signs.  Discharging patient home is reasonable plan of care for outpatient management.    All labs, imaging, and diagnostic studies were reviewed by me and patient was counseled on clinical impression, expectations, and plan.  Patient was educated to follow-up with PCP in the following 1-2 days.  All questions from patient were answered. They elicited understanding and were agreeable to course of treatment.  Patient was discharged in stable condition and given strict return  precautions.    ** Disclaimer:  Parts of this document were written utilizing a voice to text dictation software.  Note may contain minor transcription or typographical errors that were inadvertently transcribed by the computer software.        Procedure  Procedures       [1] No past medical history on file.  [2] No past surgical history on file.  [3] No family history on file.  [4]   Social History  Tobacco Use    Smoking status: Every Day     Current packs/day: 0.50     Average packs/day: 0.5 packs/day for 15.4 years (7.7 ttl pk-yrs)     Types: Cigarettes     Start date: 2010    Smokeless tobacco: Never   Substance Use Topics    Alcohol use: Never     Comment: social    Drug use: Never     Types: Marijuana     Comment: occassionally        Nena Lantigua PA-C  05/18/25 1026

## 2025-06-06 ENCOUNTER — OFFICE VISIT (OUTPATIENT)
Dept: PRIMARY CARE | Facility: CLINIC | Age: 40
End: 2025-06-06
Payer: COMMERCIAL

## 2025-06-06 VITALS
DIASTOLIC BLOOD PRESSURE: 84 MMHG | SYSTOLIC BLOOD PRESSURE: 142 MMHG | WEIGHT: 254.4 LBS | HEIGHT: 76 IN | BODY MASS INDEX: 30.98 KG/M2

## 2025-06-06 DIAGNOSIS — M79.672 LEFT FOOT PAIN: Primary | ICD-10-CM

## 2025-06-06 PROCEDURE — 4004F PT TOBACCO SCREEN RCVD TLK: CPT | Performed by: INTERNAL MEDICINE

## 2025-06-06 PROCEDURE — 99213 OFFICE O/P EST LOW 20 MIN: CPT | Performed by: INTERNAL MEDICINE

## 2025-06-06 PROCEDURE — 3008F BODY MASS INDEX DOCD: CPT | Performed by: INTERNAL MEDICINE

## 2025-06-06 RX ORDER — NABUMENTONE 750 MG/1
750 TABLET ORAL 2 TIMES DAILY
Qty: 60 TABLET | Refills: 11 | Status: SHIPPED | OUTPATIENT
Start: 2025-06-06 | End: 2026-06-06

## 2025-06-07 NOTE — PROGRESS NOTES
"Subjective   Patient ID: Umang Streeter is a 40 y.o. male who presents for Follow-up (Multiple issues).    Mr. Umang Streeter today came here for multiple issues.  Left foot big toe swelling and pain.  He bumped into stump, it is painful, difficulty in walking.  He came here for follow-up on various conditions.  Appetite and weight are okay.  No problem.    I have personally reviewed the patient's Past Medical History, Medications, Allergies, Social History, and Family History in the EMR.    Review of Systems   All other systems reviewed and are negative.    Objective   /84   Ht 1.93 m (6' 4\")   Wt 115 kg (254 lb 6.4 oz)   BMI 30.97 kg/m²     Physical Exam  Vitals reviewed.   Cardiovascular:      Heart sounds: Normal heart sounds, S1 normal and S2 normal. No murmur heard.     No friction rub.   Pulmonary:      Effort: Pulmonary effort is normal.      Breath sounds: Normal breath sounds and air entry.   Abdominal:      Palpations: There is no hepatomegaly, splenomegaly or mass.   Musculoskeletal:      Right lower leg: No edema.      Left lower leg: No edema.      Comments: Left foot big toe swelling and tenderness.  Movements painful.   Lymphadenopathy:      Lower Body: No right inguinal adenopathy. No left inguinal adenopathy.   Neurological:      Cranial Nerves: Cranial nerves 2-12 are intact.      Sensory: No sensory deficit.      Motor: Motor function is intact.      Deep Tendon Reflexes: Reflexes are normal and symmetric.     LAB WORK:  Laboratory testing discussed.    Assessment/Plan   Problem List Items Addressed This Visit    None  Visit Diagnoses         Codes      Left foot pain    -  Primary M79.672    Relevant Medications    nabumetone (Relafen) 750 mg tablet    Other Relevant Orders    Referral to Orthopedics and Sports Medicine        Left foot pain.  Advised ice, Relafen.  Referred to  ______(Elias), orthopedic surgeon.  He will follow up.  Otherwise, okay.  His job involves a lot " of walking.  I have advised him to take things easy.  Otherwise, it can take longer to heal.    Scribe Attestation  By signing my name below, I, Sina Montoya attest that this documentation has been prepared under the direction and in the presence of Jessica Griffith MD.

## 2025-06-24 ENCOUNTER — HOSPITAL ENCOUNTER (OUTPATIENT)
Dept: RADIOLOGY | Facility: HOSPITAL | Age: 40
Discharge: HOME | End: 2025-06-24
Payer: COMMERCIAL

## 2025-06-24 ENCOUNTER — OFFICE VISIT (OUTPATIENT)
Dept: ORTHOPEDIC SURGERY | Facility: HOSPITAL | Age: 40
End: 2025-06-24
Payer: COMMERCIAL

## 2025-06-24 DIAGNOSIS — S99.922A FOOT INJURY, LEFT, INITIAL ENCOUNTER: ICD-10-CM

## 2025-06-24 DIAGNOSIS — S92.502K: Primary | ICD-10-CM

## 2025-06-24 DIAGNOSIS — M79.672 LEFT FOOT PAIN: ICD-10-CM

## 2025-06-24 PROCEDURE — 99203 OFFICE O/P NEW LOW 30 MIN: CPT | Performed by: FAMILY MEDICINE

## 2025-06-24 PROCEDURE — 99204 OFFICE O/P NEW MOD 45 MIN: CPT | Performed by: FAMILY MEDICINE

## 2025-06-24 PROCEDURE — 4004F PT TOBACCO SCREEN RCVD TLK: CPT | Performed by: FAMILY MEDICINE

## 2025-06-24 PROCEDURE — 73630 X-RAY EXAM OF FOOT: CPT | Mod: LT

## 2025-06-24 PROCEDURE — 73630 X-RAY EXAM OF FOOT: CPT | Mod: LEFT SIDE | Performed by: RADIOLOGY

## 2025-06-24 ASSESSMENT — PAIN - FUNCTIONAL ASSESSMENT: PAIN_FUNCTIONAL_ASSESSMENT: 0-10

## 2025-06-24 ASSESSMENT — PAIN DESCRIPTION - DESCRIPTORS: DESCRIPTORS: ACHING

## 2025-06-24 ASSESSMENT — PAIN SCALES - GENERAL: PAINLEVEL_OUTOF10: 6

## 2025-06-24 NOTE — LETTER
June 24, 2025     Patient: Umang Streeter   YOB: 1985   Date of Visit: 6/24/2025       To Whom It May Concern:    It is my medical opinion that Umang Streeter continues to have a left toe fracture.  He is in need of evaluation with a foot surgeon.  This has been scheduled and he will continue his current immobilization and restrictions until changed by the foot surgeon..    If you have any questions or concerns, please don't hesitate to call.         Sincerely,        Herminio Hayes MD  Sports Medicine Specialist  [facility] [place]   CC: No Recipients

## 2025-06-24 NOTE — PROGRESS NOTES
Sports Medicine Office Note    Today's Date:  06/24/2025     HPI: Umang Streeter is a 40 y.o. *** who presents today for ***    Today, 06/24/2025, ***    *** has no other complaints.     Physical Examination:     ***    Imaging:  Radiographs of the *** were reviewed and revealed ***.  The studies were reviewed by me personally in the office today.    === 05/17/25 ===  XR FOOT 3+ VIEWS LEFT  - Impression -  Soft tissue swelling in the 2nd digit with possible nondisplaced  fracture of the distal aspect of the proximal phalanx. Correlation  with site of injury and overlying point tenderness recommended.  Signed by: Gwen Reyes 5/17/2025 1:21 PM    Visit Diagnoses   1. 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 Left foot pain  XR foot left 3+ views    Referral to Orthopedics and Sports Medicine          Assessment and Plan:    We reviewed the exam and x-ray findings and discussed the conservative and surgical treatment options. We agreed ***    **This note was dictated using Dragon speech recognition software and was not corrected for spelling or grammatical errors**.    Herminio Hayes MD  Sports Medicine Specialist  [facility] [place]         with nonunion        2. Left foot pain  XR foot left 3+ views    Referral to Orthopedics and Sports Medicine          Assessment and Plan:    We reviewed the exam and x-ray findings and discussed the conservative and surgical treatment options. We agreed that he has suffered an intra-articular fracture of the second toe IP joint that is showing signs of nonunion and he is still having significant pain.  I recommend that he see one of our foot and ankle surgeons for further evaluation and treatment.  He will continue on his current postop shoe and continue with oral medication for analgesia.    **This note was dictated using Dragon speech recognition software and was not corrected for spelling or grammatical errors**.    Herminio Hayes MD  Sports Medicine Specialist  [facility] [place]

## 2025-06-30 ENCOUNTER — OFFICE VISIT (OUTPATIENT)
Dept: ORTHOPEDIC SURGERY | Facility: CLINIC | Age: 40
End: 2025-06-30
Payer: COMMERCIAL

## 2025-06-30 DIAGNOSIS — S92.502K: Primary | ICD-10-CM

## 2025-06-30 PROCEDURE — 4004F PT TOBACCO SCREEN RCVD TLK: CPT | Performed by: ORTHOPAEDIC SURGERY

## 2025-06-30 PROCEDURE — 99212 OFFICE O/P EST SF 10 MIN: CPT | Performed by: ORTHOPAEDIC SURGERY

## 2025-06-30 PROCEDURE — 99204 OFFICE O/P NEW MOD 45 MIN: CPT | Performed by: ORTHOPAEDIC SURGERY

## 2025-06-30 ASSESSMENT — PAIN SCALES - GENERAL: PAINLEVEL_OUTOF10: 7

## 2025-06-30 ASSESSMENT — PAIN - FUNCTIONAL ASSESSMENT: PAIN_FUNCTIONAL_ASSESSMENT: 0-10

## 2025-06-30 NOTE — PROGRESS NOTES
"Umang Streeter    CHIEF COMPLAINT:  Left foot pain      HISTORY OF PRESENT ILLNESS:  This is a 40 y.o. male who presents today with 2nd toe pain - injury occurred in May. Initially placed in post-op shoe.   Pain is worsening overtime/not improving.     Nothing he has tried has helped.     Occupation: full time job doing Line cook  Nicotine (Smoking/Vaping) History: smoker  (1 ppd x   yrs)  Personal or Family Hx of DVT/PE: No  Metal Allergy: No  Diabetic:   No  Last Hgba1c:   No results found for: \"HGBA1C\"    Assessment/Plan:  1. 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    Physical Exam:  Well appearing male in no acute distress; Alert and oriented.  Right Lower Extremity:   Grossly intact ROM and strength, no obvious deformity.  Left  Lower Extremity:  Gait Cycle:  Normal  Inspection:  Swelling: No  Redness: No  .  Ecchymosis: No  Effusion: No    Alignment: no angular deformity  Pain on palpation:  2nd toe  ROM: Ankle- Normal.  Strength: normal  Stability:  is stable  Neurologic Status:  Sensation to all 4 compartments of lower extremity are grossly intact to light touch today in the office.  Vascular Status:   Tibialis posterior pulse: present  Dorsalis pedis pulse: present  Skin:  Normal        IMAGING:     Imaging was ordered today. Final results and radiologist's interpretation, available in the [facility] health record. Images were reviewed with the patient/family members in the office today. My personal interpretation of the performed imaging is 2nd toe non-union    Ivelisse Huffman MD  "

## 2025-07-01 LAB — 25(OH)D3+25(OH)D2 SERPL-MCNC: 13 NG/ML (ref 30–100)

## 2025-07-14 ENCOUNTER — HOSPITAL ENCOUNTER (OUTPATIENT)
Dept: RADIOLOGY | Facility: HOSPITAL | Age: 40
Discharge: HOME | End: 2025-07-14
Payer: COMMERCIAL

## 2025-07-14 DIAGNOSIS — S92.502K: ICD-10-CM

## 2025-07-14 PROCEDURE — 73700 CT LOWER EXTREMITY W/O DYE: CPT | Mod: LT

## 2025-07-14 PROCEDURE — 73700 CT LOWER EXTREMITY W/O DYE: CPT | Mod: LEFT SIDE | Performed by: STUDENT IN AN ORGANIZED HEALTH CARE EDUCATION/TRAINING PROGRAM

## 2025-07-16 ENCOUNTER — OFFICE VISIT (OUTPATIENT)
Dept: ORTHOPEDIC SURGERY | Facility: CLINIC | Age: 40
End: 2025-07-16
Payer: COMMERCIAL

## 2025-07-16 DIAGNOSIS — S92.502K: Primary | ICD-10-CM

## 2025-07-16 PROCEDURE — 99213 OFFICE O/P EST LOW 20 MIN: CPT | Performed by: ORTHOPAEDIC SURGERY

## 2025-07-16 PROCEDURE — 99212 OFFICE O/P EST SF 10 MIN: CPT | Performed by: ORTHOPAEDIC SURGERY

## 2025-07-16 NOTE — PROGRESS NOTES
Umnag JOSH Syl    CHIEF COMPLAINT:  Left foot CT follow-up    HISTORY OF PRESENT ILLNESS:  This is a 40 y.o. male who returns today for follow-up of his CT scan of his left second toe.  Since I last saw him he has started cutting back on smoking, as well as taking the vitamin D supplementation.  He reports that his toe pain has slightly improved.    Assessment and Plan:  1. 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  We discussed the risks of low vitamin D as well as smoking in risks of nonunion.  Given that he is actively adjusting these to risk factors, and his pain is already started to improve, I think we can continue watching this.  He was in agreement.    Follow up 2 to 3 months, with left foot films upon return.    Thank you for the opportunity to participate in this patient's care.    Physical Exam:  Well appearing male in no acute distress; Alert and oriented.  Left  Leg:  No swelling, erythema, warmth  Mild tenderness to palpation of the second toe  Palpable pulses, sensation is intact  Able to flex and extend toes without difficulty      IMAGING:     Imaging was ordered today. Final results and radiologist's interpretation, available in the [facility] health record. Images were reviewed with the patient/family members in the office today. My personal interpretation of the performed imaging is partially healed second proximal phalanx fracture    Ivelisse Huffman MD

## 2025-08-27 ENCOUNTER — APPOINTMENT (OUTPATIENT)
Dept: ORTHOPEDIC SURGERY | Facility: CLINIC | Age: 40
End: 2025-08-27
Payer: COMMERCIAL

## 2025-08-27 ENCOUNTER — APPOINTMENT (OUTPATIENT)
Dept: RADIOLOGY | Facility: CLINIC | Age: 40
End: 2025-08-27
Payer: COMMERCIAL

## 2025-09-01 ENCOUNTER — HOSPITAL ENCOUNTER (EMERGENCY)
Facility: HOSPITAL | Age: 40
Discharge: HOME | End: 2025-09-01
Attending: EMERGENCY MEDICINE
Payer: COMMERCIAL

## 2025-09-01 ENCOUNTER — APPOINTMENT (OUTPATIENT)
Dept: RADIOLOGY | Facility: HOSPITAL | Age: 40
End: 2025-09-01
Payer: COMMERCIAL

## 2025-09-01 VITALS
BODY MASS INDEX: 30.44 KG/M2 | RESPIRATION RATE: 18 BRPM | TEMPERATURE: 97.2 F | WEIGHT: 250 LBS | DIASTOLIC BLOOD PRESSURE: 80 MMHG | OXYGEN SATURATION: 99 % | HEIGHT: 76 IN | SYSTOLIC BLOOD PRESSURE: 138 MMHG | HEART RATE: 75 BPM

## 2025-09-01 DIAGNOSIS — S61.412A LACERATION OF LEFT HAND WITHOUT FOREIGN BODY, INITIAL ENCOUNTER: Primary | ICD-10-CM

## 2025-09-01 PROCEDURE — 73130 X-RAY EXAM OF HAND: CPT | Mod: LEFT SIDE | Performed by: STUDENT IN AN ORGANIZED HEALTH CARE EDUCATION/TRAINING PROGRAM

## 2025-09-01 PROCEDURE — 2500000004 HC RX 250 GENERAL PHARMACY W/ HCPCS (ALT 636 FOR OP/ED): Performed by: EMERGENCY MEDICINE

## 2025-09-01 PROCEDURE — 73130 X-RAY EXAM OF HAND: CPT | Mod: LT

## 2025-09-01 PROCEDURE — 90715 TDAP VACCINE 7 YRS/> IM: CPT | Performed by: EMERGENCY MEDICINE

## 2025-09-01 PROCEDURE — 12001 RPR S/N/AX/GEN/TRNK 2.5CM/<: CPT

## 2025-09-01 PROCEDURE — 99283 EMERGENCY DEPT VISIT LOW MDM: CPT | Mod: 25 | Performed by: EMERGENCY MEDICINE

## 2025-09-01 PROCEDURE — 90471 IMMUNIZATION ADMIN: CPT | Performed by: EMERGENCY MEDICINE

## 2025-09-01 RX ORDER — CEPHALEXIN 500 MG/1
500 CAPSULE ORAL 3 TIMES DAILY
Qty: 15 CAPSULE | Refills: 0 | Status: SHIPPED | OUTPATIENT
Start: 2025-09-01 | End: 2025-09-06

## 2025-09-01 RX ADMIN — TETANUS TOXOID, REDUCED DIPHTHERIA TOXOID AND ACELLULAR PERTUSSIS VACCINE, ADSORBED 0.5 ML: 5; 2.5; 8; 8; 2.5 SUSPENSION INTRAMUSCULAR at 03:28

## 2025-09-01 ASSESSMENT — LIFESTYLE VARIABLES
HAVE PEOPLE ANNOYED YOU BY CRITICIZING YOUR DRINKING: NO
HAVE YOU EVER FELT YOU SHOULD CUT DOWN ON YOUR DRINKING: NO
TOTAL SCORE: 0
EVER FELT BAD OR GUILTY ABOUT YOUR DRINKING: NO
EVER HAD A DRINK FIRST THING IN THE MORNING TO STEADY YOUR NERVES TO GET RID OF A HANGOVER: NO

## 2025-09-01 ASSESSMENT — PAIN SCALES - GENERAL: PAINLEVEL_OUTOF10: 3

## 2025-09-01 ASSESSMENT — PAIN - FUNCTIONAL ASSESSMENT: PAIN_FUNCTIONAL_ASSESSMENT: 0-10

## 2025-09-01 ASSESSMENT — PAIN DESCRIPTION - ORIENTATION: ORIENTATION: LEFT

## 2025-09-01 ASSESSMENT — PAIN DESCRIPTION - PAIN TYPE: TYPE: ACUTE PAIN

## 2025-09-01 ASSESSMENT — PAIN DESCRIPTION - LOCATION: LOCATION: HAND

## 2025-09-24 ENCOUNTER — APPOINTMENT (OUTPATIENT)
Dept: ORTHOPEDIC SURGERY | Facility: CLINIC | Age: 40
End: 2025-09-24
Payer: COMMERCIAL